# Patient Record
Sex: FEMALE | Race: WHITE | URBAN - METROPOLITAN AREA
[De-identification: names, ages, dates, MRNs, and addresses within clinical notes are randomized per-mention and may not be internally consistent; named-entity substitution may affect disease eponyms.]

---

## 2019-03-28 ENCOUNTER — OFFICE VISIT (OUTPATIENT)
Dept: URBAN - METROPOLITAN AREA CLINIC 16 | Facility: CLINIC | Age: 79
End: 2019-03-28
Payer: MEDICARE

## 2019-03-28 PROCEDURE — 92004 COMPRE OPH EXAM NEW PT 1/>: CPT | Performed by: OPTOMETRIST

## 2019-03-28 ASSESSMENT — INTRAOCULAR PRESSURE
OD: 14
OS: 14

## 2019-03-28 NOTE — IMPRESSION/PLAN
Impression: Combined forms of age-related cataract, bilateral: H25.813. OD>OS. Plan: Discussed cataract diagnosis with the patient. Discussed and reviewed treatment options for cataracts. Risks and benefits of surgical treatment were discussed and understood. Pt interested in surgical treatment. Refer for consult w/Dr. Greig Sandhoff, next available.

## 2019-04-05 ENCOUNTER — OFFICE VISIT (OUTPATIENT)
Dept: URBAN - METROPOLITAN AREA CLINIC 16 | Facility: CLINIC | Age: 79
End: 2019-04-05
Payer: MEDICARE

## 2019-04-05 PROCEDURE — 99214 OFFICE O/P EST MOD 30 MIN: CPT | Performed by: OPHTHALMOLOGY

## 2019-04-05 PROCEDURE — 99203 OFFICE O/P NEW LOW 30 MIN: CPT | Performed by: OPHTHALMOLOGY

## 2019-04-05 RX ORDER — DUREZOL 0.5 MG/ML
0.05 % EMULSION OPHTHALMIC
Qty: 5 | Refills: 0 | Status: INACTIVE
Start: 2019-04-05 | End: 2019-04-11

## 2019-04-05 RX ORDER — BESIFLOXACIN 6 MG/ML
0.6 % SUSPENSION OPHTHALMIC
Qty: 5 | Refills: 0 | Status: INACTIVE
Start: 2019-04-05 | End: 2019-04-11

## 2019-04-05 ASSESSMENT — INTRAOCULAR PRESSURE
OD: 14
OS: 14

## 2019-04-05 ASSESSMENT — VISUAL ACUITY
OD: 20/200
OS: 20/40

## 2019-04-05 ASSESSMENT — KERATOMETRY: OS: 41.75

## 2019-04-05 NOTE — IMPRESSION/PLAN
Impression: Combined forms of age-related cataract, bilateral: H25.813 OU. .  Visually significant, quality of life issue, could improve with surgery. Plan: Cataracts account for the patient's complaints. Discussed all risks, benefits, alternatives, procedures and recovery. Patient understands changing glasses will not improve vision.   Patient desires to have surgery, recommend phacoemulsification with intraocular lens implant OD.  lvl 2 - pt considering toric IOL

## 2019-04-11 RX ORDER — PREDNISOLONE ACETATE 10 MG/ML
1 % SUSPENSION/ DROPS OPHTHALMIC
Qty: 10 | Refills: 0 | Status: INACTIVE
Start: 2019-05-10 | End: 2019-06-06

## 2019-04-11 RX ORDER — PREDNISOLONE ACETATE 10 MG/ML
1 % SUSPENSION/ DROPS OPHTHALMIC
Qty: 10 | Refills: 0 | Status: INACTIVE
Start: 2019-04-11 | End: 2019-05-22

## 2019-04-11 RX ORDER — OFLOXACIN 3 MG/ML
0.3 % SOLUTION/ DROPS OPHTHALMIC
Qty: 5 | Refills: 0 | Status: INACTIVE
Start: 2019-04-11 | End: 2019-05-01

## 2019-04-11 RX ORDER — OFLOXACIN 3 MG/ML
0.3 % SOLUTION/ DROPS OPHTHALMIC
Qty: 5 | Refills: 0 | Status: INACTIVE
Start: 2019-05-08 | End: 2019-05-16

## 2019-04-12 ENCOUNTER — PRE-OPERATIVE VISIT (OUTPATIENT)
Dept: URBAN - METROPOLITAN AREA CLINIC 23 | Facility: CLINIC | Age: 79
End: 2019-04-12
Payer: MEDICARE

## 2019-04-12 DIAGNOSIS — H25.813 COMBINED FORMS OF AGE-RELATED CATARACT, BILATERAL: Primary | ICD-10-CM

## 2019-04-12 PROCEDURE — 92136 OPHTHALMIC BIOMETRY: CPT | Performed by: OPHTHALMOLOGY

## 2019-04-12 ASSESSMENT — PACHYMETRY
OS: 25.29
OS: 3.49
OD: 26.60
OD: 3.83

## 2019-04-24 ENCOUNTER — SURGERY (OUTPATIENT)
Dept: URBAN - METROPOLITAN AREA SURGERY 11 | Facility: SURGERY | Age: 79
End: 2019-04-24
Payer: MEDICARE

## 2019-04-24 PROCEDURE — 66984 XCAPSL CTRC RMVL W/O ECP: CPT | Performed by: OPHTHALMOLOGY

## 2019-04-25 ENCOUNTER — POST-OPERATIVE VISIT (OUTPATIENT)
Dept: URBAN - METROPOLITAN AREA CLINIC 16 | Facility: CLINIC | Age: 79
End: 2019-04-25

## 2019-04-25 PROCEDURE — 99024 POSTOP FOLLOW-UP VISIT: CPT | Performed by: OPTOMETRIST

## 2019-04-25 ASSESSMENT — INTRAOCULAR PRESSURE
OS: 14
OD: 11

## 2019-05-03 ENCOUNTER — OFFICE VISIT (OUTPATIENT)
Dept: URBAN - METROPOLITAN AREA CLINIC 16 | Facility: CLINIC | Age: 79
End: 2019-05-03
Payer: MEDICARE

## 2019-05-03 DIAGNOSIS — H25.812 COMBINED FORMS OF AGE-RELATED CATARACT, LEFT EYE: Primary | ICD-10-CM

## 2019-05-03 DIAGNOSIS — Z96.1 PRESENCE OF INTRAOCULAR LENS: ICD-10-CM

## 2019-05-03 PROCEDURE — 99213 OFFICE O/P EST LOW 20 MIN: CPT | Performed by: OPHTHALMOLOGY

## 2019-05-03 ASSESSMENT — INTRAOCULAR PRESSURE
OD: 14
OS: 14

## 2019-05-03 ASSESSMENT — VISUAL ACUITY: OS: 20/50

## 2019-05-03 NOTE — IMPRESSION/PLAN
Impression: Presence of intraocular lens: Z96.1. Condition: established, stable. Plan: d/c abx, taper steroid per plan. d/c shield. Her cataract was bad enough to leave a cloudy capsule at the time of surgery, and she will need a yag.

## 2019-05-09 ENCOUNTER — SURGERY (OUTPATIENT)
Dept: URBAN - METROPOLITAN AREA SURGERY 11 | Facility: SURGERY | Age: 79
End: 2019-05-09
Payer: MEDICARE

## 2019-05-09 PROCEDURE — 66984 XCAPSL CTRC RMVL W/O ECP: CPT | Performed by: OPHTHALMOLOGY

## 2019-05-10 ENCOUNTER — POST-OPERATIVE VISIT (OUTPATIENT)
Dept: URBAN - METROPOLITAN AREA CLINIC 16 | Facility: CLINIC | Age: 79
End: 2019-05-10

## 2019-05-10 PROCEDURE — 99024 POSTOP FOLLOW-UP VISIT: CPT | Performed by: OPHTHALMOLOGY

## 2019-05-10 ASSESSMENT — INTRAOCULAR PRESSURE
OS: 12
OD: 13

## 2019-05-16 ENCOUNTER — POST-OPERATIVE VISIT (OUTPATIENT)
Dept: URBAN - METROPOLITAN AREA CLINIC 16 | Facility: CLINIC | Age: 79
End: 2019-05-16

## 2019-05-16 PROCEDURE — 99024 POSTOP FOLLOW-UP VISIT: CPT | Performed by: OPTOMETRIST

## 2019-05-16 ASSESSMENT — INTRAOCULAR PRESSURE
OD: 14
OS: 12

## 2019-05-30 ENCOUNTER — POST-OPERATIVE VISIT (OUTPATIENT)
Dept: URBAN - METROPOLITAN AREA CLINIC 16 | Facility: CLINIC | Age: 79
End: 2019-05-30

## 2019-05-30 PROCEDURE — 99024 POSTOP FOLLOW-UP VISIT: CPT | Performed by: OPTOMETRIST

## 2019-05-30 ASSESSMENT — VISUAL ACUITY
OS: 20/20
OD: 20/20

## 2019-05-30 ASSESSMENT — INTRAOCULAR PRESSURE
OS: 16
OD: 15

## 2019-09-06 ENCOUNTER — OFFICE VISIT (OUTPATIENT)
Dept: URBAN - METROPOLITAN AREA CLINIC 16 | Facility: CLINIC | Age: 79
End: 2019-09-06
Payer: MEDICARE

## 2019-09-06 DIAGNOSIS — H26.493 OTHER SECONDARY CATARACT, BILATERAL: Primary | ICD-10-CM

## 2019-09-06 PROCEDURE — 99214 OFFICE O/P EST MOD 30 MIN: CPT | Performed by: OPHTHALMOLOGY

## 2019-09-06 ASSESSMENT — INTRAOCULAR PRESSURE
OD: 15
OS: 15

## 2019-09-06 ASSESSMENT — VISUAL ACUITY
OS: 20/25
OD: 20/25

## 2019-09-06 ASSESSMENT — KERATOMETRY
OS: 41.50
OD: 41.00

## 2019-09-06 NOTE — IMPRESSION/PLAN
Impression: Other secondary cataract, bilateral: H26.493. Monique Patience Visually significant, quality of life issue, could improve with surgery. Plan: Discussed all risks, benefits, alternatives, procedures and recovery. Patient understands changing glasses will not improve vision. Patient desires to have surgery, recommend yag capsulotomy OD.

## 2019-10-07 ENCOUNTER — SURGERY (OUTPATIENT)
Dept: URBAN - METROPOLITAN AREA SURGERY 11 | Facility: SURGERY | Age: 79
End: 2019-10-07
Payer: MEDICARE

## 2019-10-07 PROCEDURE — 66821 AFTER CATARACT LASER SURGERY: CPT | Performed by: OPHTHALMOLOGY

## 2019-10-15 ENCOUNTER — POST-OPERATIVE VISIT (OUTPATIENT)
Dept: URBAN - METROPOLITAN AREA CLINIC 16 | Facility: CLINIC | Age: 79
End: 2019-10-15

## 2019-10-15 DIAGNOSIS — Z09 ENCNTR FOR F/U EXAM AFT TRTMT FOR COND OTH THAN MALIG NEOPLM: Primary | ICD-10-CM

## 2019-10-15 PROCEDURE — 99024 POSTOP FOLLOW-UP VISIT: CPT | Performed by: OPTOMETRIST

## 2019-10-15 ASSESSMENT — INTRAOCULAR PRESSURE
OD: 14
OS: 15

## 2019-10-15 ASSESSMENT — VISUAL ACUITY
OS: 20/25
OD: 20/20